# Patient Record
Sex: FEMALE | Race: OTHER
[De-identification: names, ages, dates, MRNs, and addresses within clinical notes are randomized per-mention and may not be internally consistent; named-entity substitution may affect disease eponyms.]

---

## 2017-10-09 ENCOUNTER — APPOINTMENT (OUTPATIENT)
Dept: MAMMOGRAPHY | Facility: IMAGING CENTER | Age: 67
End: 2017-10-09
Payer: MEDICARE

## 2017-10-09 ENCOUNTER — OUTPATIENT (OUTPATIENT)
Dept: OUTPATIENT SERVICES | Facility: HOSPITAL | Age: 67
LOS: 1 days | End: 2017-10-09
Payer: MEDICARE

## 2017-10-09 ENCOUNTER — APPOINTMENT (OUTPATIENT)
Dept: RADIOLOGY | Facility: IMAGING CENTER | Age: 67
End: 2017-10-09
Payer: MEDICARE

## 2017-10-09 DIAGNOSIS — Z00.8 ENCOUNTER FOR OTHER GENERAL EXAMINATION: ICD-10-CM

## 2017-10-09 PROCEDURE — 77063 BREAST TOMOSYNTHESIS BI: CPT | Mod: 26

## 2017-10-09 PROCEDURE — 77063 BREAST TOMOSYNTHESIS BI: CPT

## 2017-10-09 PROCEDURE — 77067 SCR MAMMO BI INCL CAD: CPT

## 2017-10-09 PROCEDURE — 77080 DXA BONE DENSITY AXIAL: CPT | Mod: 26

## 2017-10-09 PROCEDURE — G0202: CPT | Mod: 26

## 2017-10-09 PROCEDURE — 77080 DXA BONE DENSITY AXIAL: CPT

## 2019-10-31 ENCOUNTER — RESULT REVIEW (OUTPATIENT)
Age: 69
End: 2019-10-31

## 2020-12-02 ENCOUNTER — TRANSCRIPTION ENCOUNTER (OUTPATIENT)
Age: 70
End: 2020-12-02

## 2024-06-20 ENCOUNTER — APPOINTMENT (OUTPATIENT)
Dept: SURGERY | Facility: CLINIC | Age: 74
End: 2024-06-20
Payer: MEDICARE

## 2024-06-20 VITALS
WEIGHT: 111 LBS | DIASTOLIC BLOOD PRESSURE: 83 MMHG | SYSTOLIC BLOOD PRESSURE: 151 MMHG | HEIGHT: 60 IN | BODY MASS INDEX: 21.79 KG/M2 | HEART RATE: 74 BPM | OXYGEN SATURATION: 98 %

## 2024-06-20 DIAGNOSIS — K40.90 UNILATERAL INGUINAL HERNIA, W/OUT OBSTRUCTION OR GANGRENE, NOT SPECIFIED AS RECURRENT: ICD-10-CM

## 2024-06-20 DIAGNOSIS — Z87.891 PERSONAL HISTORY OF NICOTINE DEPENDENCE: ICD-10-CM

## 2024-06-20 PROCEDURE — 99203 OFFICE O/P NEW LOW 30 MIN: CPT

## 2024-06-20 RX ORDER — METFORMIN HYDROCHLORIDE 625 MG/1
TABLET ORAL
Refills: 0 | Status: ACTIVE | COMMUNITY

## 2024-06-20 RX ORDER — ROSUVASTATIN CALCIUM 5 MG/1
TABLET, FILM COATED ORAL
Refills: 0 | Status: ACTIVE | COMMUNITY

## 2024-06-20 NOTE — PHYSICAL EXAM
[Alert] : alert [Oriented to Person] : oriented to person [Oriented to Place] : oriented to place [Oriented to Time] : oriented to time [Calm] : calm [de-identified] : She  is alert, well-groomed, and in NAD   [de-identified] : anicteric.  Nasal mucosa pink, septum midline. Oral mucosa pink.  Tongue midline, Pharynx without exudates.   [de-identified] : Neck supple. Trachea midline. Thyroid isthmus barely palpable, lobes not felt.   [de-identified] :  reducible Left   inguinal hernia.  No evidence of hernia on the opposite side.

## 2024-06-20 NOTE — HISTORY OF PRESENT ILLNESS
Spoke with patient and let her know her results. She voiced understanding. She has an appointment coming up on 11/12/2018. She stated she would keep that appointment.   [de-identified] : Ms. WILFRIDO NAGEL  is  a 74 year   old patient  who was referred by Dr. Az Robbins with the chief complaint of having pain and swelling in her  Left   groin.  She has had the condition for 1 months and is worse when she   is walking ,standing or straining.  Ms. NAGEL is stating that the swelling  is getting bigger and symptomatic. She denies any fever or  night sweats.  Appetite is good and weight is stable.

## 2024-06-20 NOTE — PLAN
[FreeTextEntry1] : Ms. NAGEL  was told significance of findings, options, risks and benefits were explained.  Informed consent for left inguinal hernia repair , and potential risks, benefits and alternatives (surgical options were discussed including non-surgical options or the option of no surgery) to the planned surgery were discussed in depth.  All surgical options were discussed including non-surgical treatments.  She wishes to proceed with surgery.  We will plan for surgery on at the next available date, pending any required insurance pre-certification or pre-approval. She agrees to obtain any necessary pre-operative evaluations and testing prior to surgery. Patient advised to seek immediate medical attention with any acute change in symptoms or with the development of any new or worsening symptoms.  Patient's questions and concerns addressed to patient's satisfaction, and patient verbalized an understanding of the information discussed.

## 2024-07-10 ENCOUNTER — OUTPATIENT (OUTPATIENT)
Dept: OUTPATIENT SERVICES | Facility: HOSPITAL | Age: 74
LOS: 1 days | End: 2024-07-10
Payer: MEDICARE

## 2024-07-10 VITALS
HEIGHT: 60 IN | DIASTOLIC BLOOD PRESSURE: 70 MMHG | HEART RATE: 89 BPM | SYSTOLIC BLOOD PRESSURE: 118 MMHG | TEMPERATURE: 98 F | WEIGHT: 111.99 LBS | OXYGEN SATURATION: 98 % | RESPIRATION RATE: 16 BRPM

## 2024-07-10 DIAGNOSIS — E11.9 TYPE 2 DIABETES MELLITUS WITHOUT COMPLICATIONS: ICD-10-CM

## 2024-07-10 DIAGNOSIS — Z98.51 TUBAL LIGATION STATUS: Chronic | ICD-10-CM

## 2024-07-10 DIAGNOSIS — K40.90 UNILATERAL INGUINAL HERNIA, WITHOUT OBSTRUCTION OR GANGRENE, NOT SPECIFIED AS RECURRENT: ICD-10-CM

## 2024-07-10 DIAGNOSIS — Z01.818 ENCOUNTER FOR OTHER PREPROCEDURAL EXAMINATION: ICD-10-CM

## 2024-07-10 NOTE — H&P PST ADULT - ASSESSMENT
74 y.o female with PMHx for HLD, DM, c/o of Left groin pain and swelling for 4 weeks. On w/u found to have Left inguinal Hernia and now for schedule Left Inguinal Hernia Repair on 7/16/24 with Dr Garcia VASQUEZ 1

## 2024-07-10 NOTE — H&P PST ADULT - NSICDXPROCEDURE_GEN_ALL_CORE_FT
PROCEDURES:  Repair, hernia, inguinal, open, using mesh, adult 10-Jul-2024 14:38:20  Willie Benavidez

## 2024-07-10 NOTE — H&P PST ADULT - PROBLEM SELECTOR PLAN 1
Pt schedule for Left Inguinal Hernia Repair on 7/16/24 with Dr Zelaya    Labs drawn by PCP -  report in chart   Pt was seen by PCP for Medical clearance - report in chart     Pt was  instructed to stop aspirin/ecotrin and all over the counter medication including vitamins and herbal supplements one week prior to surgery   Instructions given on the use of 4% chlorhexidine wash and Pt verbalized understanding of same   Pt Instructed to have nothing by mouth starting midnight day before surgery  Patient is to expect a phone call day before surgery between the hours of 430- 630pm giving arrival time for surgery   Written and verbal preoperative instructions given to patient with understanding verbalized.     Patient today with STOP bang score 1  Low  risk for PILI

## 2024-07-10 NOTE — H&P PST ADULT - NSANTHOSAYNRD_GEN_A_CORE
No. PILI screening performed.  STOP BANG Legend: 0-2 = LOW Risk; 3-4 = INTERMEDIATE Risk; 5-8 = HIGH Risk

## 2024-07-10 NOTE — H&P PST ADULT - HISTORY OF PRESENT ILLNESS
74 y.o female with PMHx for HLD, DM, c/o of Left groin pain and swelling for 4 weeks. On w/u found to have Left inguinal Hernia and now for schedule Left Inguinal Hernia Repair on 7/16/24 with Dr Zelaya

## 2024-07-11 PROBLEM — K40.90 UNILATERAL INGUINAL HERNIA, WITHOUT OBSTRUCTION OR GANGRENE, NOT SPECIFIED AS RECURRENT: Chronic | Status: ACTIVE | Noted: 2024-07-10

## 2024-07-11 PROCEDURE — G0463: CPT

## 2024-07-16 ENCOUNTER — APPOINTMENT (OUTPATIENT)
Dept: SURGERY | Facility: HOSPITAL | Age: 74
End: 2024-07-16

## 2024-07-16 ENCOUNTER — OUTPATIENT (OUTPATIENT)
Dept: OUTPATIENT SERVICES | Facility: HOSPITAL | Age: 74
LOS: 1 days | End: 2024-07-16
Payer: MEDICARE

## 2024-07-16 ENCOUNTER — TRANSCRIPTION ENCOUNTER (OUTPATIENT)
Age: 74
End: 2024-07-16

## 2024-07-16 VITALS
RESPIRATION RATE: 16 BRPM | SYSTOLIC BLOOD PRESSURE: 126 MMHG | DIASTOLIC BLOOD PRESSURE: 70 MMHG | TEMPERATURE: 97 F | OXYGEN SATURATION: 99 % | HEART RATE: 64 BPM

## 2024-07-16 VITALS
RESPIRATION RATE: 16 BRPM | DIASTOLIC BLOOD PRESSURE: 77 MMHG | SYSTOLIC BLOOD PRESSURE: 125 MMHG | HEIGHT: 60 IN | WEIGHT: 111.99 LBS | OXYGEN SATURATION: 100 % | HEART RATE: 90 BPM | TEMPERATURE: 98 F

## 2024-07-16 DIAGNOSIS — K40.90 UNILATERAL INGUINAL HERNIA, WITHOUT OBSTRUCTION OR GANGRENE, NOT SPECIFIED AS RECURRENT: ICD-10-CM

## 2024-07-16 DIAGNOSIS — Z98.51 TUBAL LIGATION STATUS: Chronic | ICD-10-CM

## 2024-07-16 LAB
GLUCOSE BLDC GLUCOMTR-MCNC: 104 MG/DL — HIGH (ref 70–99)
GLUCOSE BLDC GLUCOMTR-MCNC: 104 MG/DL — HIGH (ref 70–99)

## 2024-07-16 PROCEDURE — 82962 GLUCOSE BLOOD TEST: CPT

## 2024-07-16 PROCEDURE — 49505 PRP I/HERN INIT REDUC >5 YR: CPT | Mod: AS,LT

## 2024-07-16 PROCEDURE — 49505 PRP I/HERN INIT REDUC >5 YR: CPT | Mod: LT

## 2024-07-16 RX ORDER — FENTANYL CITRATE 50 UG/ML
50 INJECTION, SOLUTION INTRAMUSCULAR; INTRAVENOUS
Refills: 0 | Status: DISCONTINUED | OUTPATIENT
Start: 2024-07-16 | End: 2024-07-16

## 2024-07-16 RX ORDER — METFORMIN HYDROCHLORIDE 850 MG/1
1 TABLET, FILM COATED ORAL
Refills: 0 | DISCHARGE

## 2024-07-16 RX ORDER — SODIUM CHLORIDE 0.9 % (FLUSH) 0.9 %
3 SYRINGE (ML) INJECTION EVERY 8 HOURS
Refills: 0 | Status: DISCONTINUED | OUTPATIENT
Start: 2024-07-16 | End: 2024-07-16

## 2024-07-16 RX ORDER — ONDANSETRON HYDROCHLORIDE 2 MG/ML
4 INJECTION INTRAMUSCULAR; INTRAVENOUS ONCE
Refills: 0 | Status: DISCONTINUED | OUTPATIENT
Start: 2024-07-16 | End: 2024-07-16

## 2024-07-16 RX ORDER — OXYCODONE HYDROCHLORIDE 100 MG/5ML
1 SOLUTION ORAL
Qty: 8 | Refills: 0
Start: 2024-07-16 | End: 2024-07-17

## 2024-07-16 RX ORDER — ACETAMINOPHEN 325 MG
650 TABLET ORAL ONCE
Refills: 0 | Status: DISCONTINUED | OUTPATIENT
Start: 2024-07-16 | End: 2024-07-30

## 2024-07-16 RX ORDER — FENTANYL CITRATE 50 UG/ML
25 INJECTION, SOLUTION INTRAMUSCULAR; INTRAVENOUS
Refills: 0 | Status: DISCONTINUED | OUTPATIENT
Start: 2024-07-16 | End: 2024-07-16

## 2024-07-16 RX ORDER — OXYCODONE HYDROCHLORIDE 100 MG/5ML
5 SOLUTION ORAL ONCE
Refills: 0 | Status: DISCONTINUED | OUTPATIENT
Start: 2024-07-16 | End: 2024-07-16

## 2024-07-16 RX ORDER — DEXTROSE MONOHYDRATE AND SODIUM CHLORIDE 5; .3 G/100ML; G/100ML
1000 INJECTION, SOLUTION INTRAVENOUS
Refills: 0 | Status: DISCONTINUED | OUTPATIENT
Start: 2024-07-16 | End: 2024-07-16

## 2024-07-16 RX ORDER — ROSUVASTATIN CALCIUM 20 MG/1
1 TABLET ORAL
Refills: 0 | DISCHARGE

## 2024-07-16 RX ADMIN — OXYCODONE HYDROCHLORIDE 5 MILLIGRAM(S): 100 SOLUTION ORAL at 14:14

## 2024-07-16 RX ADMIN — Medication 3 MILLILITER(S): at 09:24

## 2024-07-16 RX ADMIN — OXYCODONE HYDROCHLORIDE 5 MILLIGRAM(S): 100 SOLUTION ORAL at 13:24

## 2024-07-16 NOTE — ASU DISCHARGE PLAN (ADULT/PEDIATRIC) - CARE PROVIDER_API CALL
Geoff Zelaya  Surgery  [address] [facility]  [place] [address]  Phone: [phone]  Fax: [phone]  Follow Up Time:

## 2024-07-16 NOTE — BRIEF OPERATIVE NOTE - NSICDXBRIEFPROCEDURE_GEN_ALL_CORE_FT
PROCEDURES:  Open repair of recurrent inguinal hernia using suture 16-Jul-2024 11:46:50  Cece Andrew

## 2024-07-16 NOTE — PACU DISCHARGE NOTE - PAIN:
Controlled with current regime You were seen in the ED for a cough and headache. Your blood work showed you have enterovirus, the common cold.     You may use Tylenol 650mg every 8 hours or Motrin 600mg every 8 hours as needed for pain. Drink plenty of fluids.     Return for worsening headache or any other concerns.

## 2024-07-31 ENCOUNTER — NON-APPOINTMENT (OUTPATIENT)
Age: 74
End: 2024-07-31

## 2024-08-01 ENCOUNTER — APPOINTMENT (OUTPATIENT)
Dept: SURGERY | Facility: CLINIC | Age: 74
End: 2024-08-01
Payer: MEDICARE

## 2024-08-01 DIAGNOSIS — K40.90 UNILATERAL INGUINAL HERNIA, W/OUT OBSTRUCTION OR GANGRENE, NOT SPECIFIED AS RECURRENT: ICD-10-CM

## 2024-08-01 PROCEDURE — 99024 POSTOP FOLLOW-UP VISIT: CPT

## 2024-08-01 NOTE — HISTORY OF PRESENT ILLNESS
[de-identified] : Ms. NAGEL  is s/p  left inguinal hernia repair on 07/16/2024. Today  Ms. NAGEL offers no complaints. patient reports no fever, chills,  or  pain.  Her surgical wound is healing well. No signs of inflammation, infection or exudate.   Patient reports good bowel movements and appetite.

## 2024-08-01 NOTE — PHYSICAL EXAM
[Alert] : alert [Oriented to Person] : oriented to person [Oriented to Place] : oriented to place [Oriented to Time] : oriented to time [Calm] : calm [de-identified] : She  is alert, well-groomed, and in NAD   [de-identified] : anicteric.  Nasal mucosa pink, septum midline. Oral mucosa pink.  Tongue midline, Pharynx without exudates.   [de-identified] : Neck supple. Trachea midline. Thyroid isthmus barely palpable, lobes not felt.   [de-identified] : left groin Surgical wound is healing well.   no signs of  inflammation or infection.

## 2024-08-01 NOTE — ASSESSMENT
[FreeTextEntry1] : Ms. NAGEL is doing well, with excellent post-operative recovery. All surgical incisions are healing well and as expected. There is no evidence of infection or complication, and she is progressing as expected. Post-operative wound care, activity, restrictions and precautions reinforced. Patient instructed to refrain from any heavy lifting greater than 10-15 pounds for at least 4-6 weeks post-operatively. Patient's questions and concerns addressed to patient's satisfaction.

## 2024-08-01 NOTE — PHYSICAL EXAM
[Alert] : alert [Oriented to Person] : oriented to person [Oriented to Place] : oriented to place [Oriented to Time] : oriented to time [Calm] : calm [de-identified] : She  is alert, well-groomed, and in NAD   [de-identified] : anicteric.  Nasal mucosa pink, septum midline. Oral mucosa pink.  Tongue midline, Pharynx without exudates.   [de-identified] : Neck supple. Trachea midline. Thyroid isthmus barely palpable, lobes not felt.   [de-identified] : left groin Surgical wound is healing well.   no signs of  inflammation or infection.

## 2024-08-01 NOTE — REVIEW OF SYSTEMS
MINDY OSBORNE ; 06/01/2020 ; [facility] Otol[facility] [address] [facility]  MINDY OSBORNE ; 06/01/2020 ; N[facility] Cardio [address]
[Negative] : Heme/Lymph
[Negative] : Heme/Lymph
MINDY OSBORNE ; 05/26/2020 ; [facility]  MINDY OSBORNE ; 06/01/2020 ; NPP Otolaryng [address] [facility]  MINDY OSBORNE ; 06/01/2020 ; NPP Cardio [address] [place]
MINDY OSBORNE ; 05/26/2020 ; [facility]  MINDY OSBORNE ; 06/01/2020 ; NPP Otolaryng [address] [facility]  MINDY OSBORNE ; 06/01/2020 ; NPP Cardio [address] [place]
MINDY OSBORNE ; 05/26/2020 ; [facility]  MINDY OSBORNE ; 06/01/2020 ; NPP Otolaryng [address] [facility]  MINDY OSBORNE ; 06/01/2020 ; NPP Cardio [address] [place]
negative

## 2024-08-01 NOTE — HISTORY OF PRESENT ILLNESS
[de-identified] : Ms. NAGEL  is s/p  left inguinal hernia repair on 07/16/2024. Today  Ms. NAGEL offers no complaints. patient reports no fever, chills,  or  pain.  Her surgical wound is healing well. No signs of inflammation, infection or exudate.   Patient reports good bowel movements and appetite.

## 2024-11-07 ENCOUNTER — APPOINTMENT (OUTPATIENT)
Dept: SURGERY | Facility: CLINIC | Age: 74
End: 2024-11-07
Payer: MEDICARE

## 2024-11-07 VITALS
OXYGEN SATURATION: 96 % | BODY MASS INDEX: 21.4 KG/M2 | WEIGHT: 109 LBS | DIASTOLIC BLOOD PRESSURE: 81 MMHG | HEIGHT: 60 IN | SYSTOLIC BLOOD PRESSURE: 135 MMHG | HEART RATE: 85 BPM

## 2024-11-07 DIAGNOSIS — K40.90 UNILATERAL INGUINAL HERNIA, W/OUT OBSTRUCTION OR GANGRENE, NOT SPECIFIED AS RECURRENT: ICD-10-CM

## 2024-11-07 PROCEDURE — 99213 OFFICE O/P EST LOW 20 MIN: CPT

## (undated) DEVICE — DRAPE LIGHT HANDLE COVER (BLUE)

## (undated) DEVICE — DRSG TEGADERM 4X4.75"

## (undated) DEVICE — VENODYNE/SCD SLEEVE CALF MEDIUM

## (undated) DEVICE — SUT SURGIPRO 2-0 30" GS-22

## (undated) DEVICE — SOL IRR POUR NS 0.9% 1500ML

## (undated) DEVICE — SUT POLYSORB 2-0 18" TIES UNDYED

## (undated) DEVICE — SUT POLYSORB 4-0 27" P-12 UNDYED

## (undated) DEVICE — SUT POLYSORB 2-0 30" V-20 UNDYED

## (undated) DEVICE — DRSG MASTISOL

## (undated) DEVICE — GLV 7 PROTEXIS (WHITE)

## (undated) DEVICE — DRAPE LAPAROTOMY W POUCHES

## (undated) DEVICE — GLV 7.5 PROTEXIS (WHITE)

## (undated) DEVICE — SUT POLYSORB 3-0 30" V-20 UNDYED

## (undated) DEVICE — WARMING BLANKET UPPER ADULT

## (undated) DEVICE — PREP CHLORAPREP HI-LITE ORANGE 26ML

## (undated) DEVICE — PACK MINOR NO DRAPE

## (undated) DEVICE — ELCTR GROUNDING PAD ADULT COVIDIEN

## (undated) DEVICE — DRAIN PENROSE 5/8" X 18" LATEX

## (undated) DEVICE — DRSG CURITY GAUZE SPONGE 4 X 4" 12-PLY

## (undated) DEVICE — NDL HYPO SAFE 25G X 1.5" (ORANGE)